# Patient Record
Sex: FEMALE | Race: WHITE | Employment: STUDENT | URBAN - METROPOLITAN AREA
[De-identification: names, ages, dates, MRNs, and addresses within clinical notes are randomized per-mention and may not be internally consistent; named-entity substitution may affect disease eponyms.]

---
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
## 2022-07-29 ENCOUNTER — PATIENT MESSAGE (OUTPATIENT)
Dept: OBGYN CLINIC | Age: 22
End: 2022-07-29

## 2022-08-05 ENCOUNTER — OFFICE VISIT (OUTPATIENT)
Dept: OBGYN CLINIC | Age: 22
End: 2022-08-05
Payer: COMMERCIAL

## 2022-08-05 VITALS
BODY MASS INDEX: 23.22 KG/M2 | HEART RATE: 74 BPM | HEIGHT: 61 IN | DIASTOLIC BLOOD PRESSURE: 69 MMHG | WEIGHT: 123 LBS | SYSTOLIC BLOOD PRESSURE: 111 MMHG

## 2022-08-05 DIAGNOSIS — N92.4 EXCESSIVE BLEEDING IN PREMENOPAUSAL PERIOD: ICD-10-CM

## 2022-08-05 DIAGNOSIS — Z76.89 ENCOUNTER FOR MENSTRUAL REGULATION: ICD-10-CM

## 2022-08-05 DIAGNOSIS — N94.6 DYSMENORRHEA: Primary | ICD-10-CM

## 2022-08-05 PROCEDURE — 99212 OFFICE O/P EST SF 10 MIN: CPT | Performed by: OBSTETRICS & GYNECOLOGY

## 2022-08-05 NOTE — PROGRESS NOTES
[address] [facility]  [email or web address]  [phone]    Elissa Huffman MD, FACOG       OB/GYN Problem visit    Chief Complaint:   Chief Complaint   Patient presents with    Birth Control       Last or next WWE is: 8/17/22    History of Present Illness: This is a new problem being evaluated by this provider. Pt was on Blisovi OCP in the past, then switched to [facility], she notices on [facility] she has cramps & HA. Pt wants to switch to something else for birth control. AE due after 8/17/22, but pt is going to college & plays sports. Pt declines STD testing. More nausea and breast tenderness on OCP. The patient is a 25 y.o. No obstetric history on file. She reports the symptoms are has worsened. Aggravating factors include none. Alleviating factors include none. She does not have other concerns. LMP: No LMP recorded. PFSH:  Past Medical History:   Diagnosis Date    HPV vaccine counseling     completed    Migraine headache     without aura     History reviewed. No pertinent surgical history. Family History   Problem Relation Age of Onset    No Known Problems Mother      Social History     Tobacco Use    Smoking status: Never    Smokeless tobacco: Former   Substance Use Topics    Alcohol use: No    Drug use: Never     Allergies   Allergen Reactions    Pcn [Penicillins] Hives     Current Outpatient Medications   Medication Sig    drospirenone-ethinyl estradioL ([place], 28,) 3-0.02 mg tab Take 1 Tablet by mouth daily. No current facility-administered medications for this visit.        Review of Systems:  History obtained from the patient  Constitutional: see HPI  ENT ROS: negative for - hearing change, oral lesions or visual changes  Respiratory: negative for cough, wheezing or dyspnea on exertion  Cardiovascular: negative for chest pain, irregular heart beats, exertional chest pressure/discomfort  Gastrointestinal: negative for dysphagia, nausea and vomiting  Genito-Urinary ROS:  see HPI  Inteument/breast: negative for rash, breast lump and nipple discharge  Musculoskeletal:negative for stiff joints, neck pain and muscle weakness  Endocrine ROS: negative for - breast changes, galactorrhea or temperature intolerance  Hematological and Lymphatic ROS: negative for - blood clots, bruising or swollen lymph nodes    Physical Exam:  Visit Vitals  /69   Pulse 74   Ht 5' 1\" (1.549 m)   Wt 123 lb (55.8 kg)   BMI 23.24 kg/m²       GENERAL: alert, well appearing, and in no distress  HEAD: normocephalic, atraumatic. NEURO: alert, oriented, normal speech    Assessment:  Encounter Diagnoses   Name Primary? Dysmenorrhea Yes    Excessive bleeding in premenopausal period        Plan:  The patient is advised that she should contact the office if she does not note improvement or if symptoms recur  Recommend follow up with PCP for non-gynecologic complaints and chronic medical problems. She should contact our office with any questions or concerns  She could keep her routine annual exam appointment. Discussed risks, benefits and alternatives of OCP/nuvaring/patch: including but not limited to dvt/pe/mi/cva/ca/gi risks and that smoking, increasing age and other health conditions can increase these risks. We discussed progesterone only and non hormonal options for contraception including but not limited to condoms, IUDs, Nexplanon, and depo provera. Nexplanon ho   LARC ho  Will try to work in before she goes to school and then do wwe when home on break      No orders of the defined types were placed in this encounter. No results found for this visit on 08/05/22.

## 2022-08-19 ENCOUNTER — OFFICE VISIT (OUTPATIENT)
Dept: OBGYN CLINIC | Age: 22
End: 2022-08-19
Payer: COMMERCIAL

## 2022-08-19 DIAGNOSIS — Z76.89 ENCOUNTER FOR MENSTRUAL REGULATION: ICD-10-CM

## 2022-08-19 DIAGNOSIS — Z01.812 PRE-PROCEDURE LAB EXAM: Primary | ICD-10-CM

## 2022-08-19 DIAGNOSIS — Z30.017 NEXPLANON INSERTION: ICD-10-CM

## 2022-08-19 LAB
HCG URINE, QL. (POC): NEGATIVE
VALID INTERNAL CONTROL?: YES

## 2022-08-19 PROCEDURE — 81025 URINE PREGNANCY TEST: CPT | Performed by: OBSTETRICS & GYNECOLOGY

## 2022-08-19 NOTE — PROGRESS NOTES
Pt presents today for Nexplanon insertion. Our office was unable to obtain a Nexplanon to insert in patient. Clinical Supervisor \"Radha\" [facility] discussed with pt how we will need to r/s appt. Pt was r/s to 9:40am on Monday. Pt will drive 2 hours away from school to have Nexplanon inserted. Pt instructed not to have unprotected intercourse this weekend.

## 2022-08-22 ENCOUNTER — OFFICE VISIT (OUTPATIENT)
Dept: OBGYN CLINIC | Age: 22
End: 2022-08-22
Payer: COMMERCIAL

## 2022-08-22 VITALS
HEART RATE: 78 BPM | DIASTOLIC BLOOD PRESSURE: 79 MMHG | BODY MASS INDEX: 23.32 KG/M2 | WEIGHT: 123.4 LBS | SYSTOLIC BLOOD PRESSURE: 117 MMHG

## 2022-08-22 DIAGNOSIS — Z76.89 ENCOUNTER FOR MENSTRUAL REGULATION: ICD-10-CM

## 2022-08-22 DIAGNOSIS — Z30.017 NEXPLANON INSERTION: ICD-10-CM

## 2022-08-22 DIAGNOSIS — Z01.812 PRE-PROCEDURE LAB EXAM: Primary | ICD-10-CM

## 2022-08-22 LAB
HCG URINE, QL. (POC): NEGATIVE
VALID INTERNAL CONTROL?: YES

## 2022-08-22 PROCEDURE — 81025 URINE PREGNANCY TEST: CPT | Performed by: OBSTETRICS & GYNECOLOGY

## 2022-08-22 PROCEDURE — 11981 INSERTION DRUG DLVR IMPLANT: CPT | Performed by: OBSTETRICS & GYNECOLOGY

## 2022-08-22 NOTE — PROGRESS NOTES
[address] [facility]  [email or web address]  [phone]    Eduar Galaviz MD, FACOG       [facility]  OFFICE PROCEDURE PROGRESS NOTE    Chart reviewed for the following:   I, Sonya Osorio LPN, have reviewed the History, Physical and updated the [place] reactions for [address] performed immediately prior to start of procedure:   Aleksey Kahn LPN, have performed the following reviews on [address] prior to the start of the procedure:            * Patient was identified by name and date of birth   * Agreement on procedure being performed was verified  * Risks and Benefits explained to the patient  * Procedure site verified and marked as necessary  * Patient was positioned for comfort  * Consent was signed and verified     Time: 0945    Date of procedure: 8/22/2022    Procedure performed by: Eduar Galaviz MD    Provider assisted by:   Sonya Osorio LPN    Patient assisted by: self    How tolerated by patient: tolerated the procedure well with no complications    Post Procedural Pain Scale: 0 - No Hurt    Comments: none      Procedure note: Nexplanon insertion    [address] is a No obstetric history on file. ,  25 y.o. female WHITE/NON- whose No LMP recorded. was on  presents for office insertion of an [address] sub-dermal contraceptive implant. She has had an opportunity to read the [address] \"Patient Labeling and Consent Form\". We counseled the patient about the insertion and removal procedures as well as potential side-effects, benefits and risks. She state she had no further questions and signed the consent form. She has been using OCP (Oral Contraceptive Pills) to the present time. She confirmed that she has no allergies to Betadine or Xylocaine. She reclined on the examination table in the supine position with her left arm flexed at the elbow and externally rotated.  The insertion site was identified and marked approximately 6-8 cm proximal to the elbow crease at the inner side of the upper arm in the standard fashion. A second madhuri was placed 6-8 cm above the first madhuri. The insertion site was cleansed with Betadine antiseptic. Approximately 4 ml of 1% plain xylocaine were injected just under the skin along the planned insertion canal. The NEXPLANON sterile applicator was carefully removed from its blister pack and kept sterile. I removed the needle cap. I visually verified the presence of NEXPLANON inside the needle tip. The skin at the insertion site was then stretched by my thumb and index finger. I then inserted the needle tip through the skin at the appropriate angle to the skin surface, just until the skin has been penetrated. The needle was gently inserted to its full length. The slider was then pushed all the way and then released. I then removed the needle and palpated the implant in the appropriate location. The patient also palpated the implant in place. Both the patient and I were able to confirm the presence of the Anais Mussel in its subdermal location by palpation. A small adhesive bandage over the insertion site then wrapped a pressure bandage with sterile gauze. The patient User Card and Patient Chart Label were filled in. She was given the User Card for her records after explaining it to her in detail. I stressed to her that she must have the Anais Mussel removed before three years from today's date. She was then given the Personal Calendar (Bleeding Diary) with instructions in it's use. The patient received Nexplanon lot number [id number]. The Nexplanon did not come from a speciality pharmacy. We discussed typical bleeding patterns and side effects with the Nexplanon  We recommend bleeding/symptom calendar and follow three months to evaluate. We recommended back up contraception x 2 weeks after insertion.

## 2022-08-29 RX ORDER — DROSPIRENONE AND ETHINYL ESTRADIOL 0.02-3(28)
KIT ORAL
Qty: 84 TABLET | Refills: 4 | OUTPATIENT
Start: 2022-08-29

## 2023-06-22 ENCOUNTER — PROCEDURE VISIT (OUTPATIENT)
Age: 23
End: 2023-06-22

## 2023-06-22 VITALS
HEART RATE: 88 BPM | WEIGHT: 140.4 LBS | SYSTOLIC BLOOD PRESSURE: 111 MMHG | BODY MASS INDEX: 26.51 KG/M2 | HEIGHT: 61 IN | DIASTOLIC BLOOD PRESSURE: 71 MMHG

## 2023-06-22 DIAGNOSIS — Z30.46 NEXPLANON REMOVAL: ICD-10-CM

## 2023-06-22 DIAGNOSIS — Z76.89 ENCOUNTER FOR MENSTRUAL REGULATION: Primary | ICD-10-CM

## 2023-06-22 NOTE — PROGRESS NOTES
Jerson Rossi is a 23 y.o. female presents for a problem visit. No chief complaint on file. Problems: nexplanon removal to have no hormones. Pt reports she has been on birth control the last 5 years. No LMP recorded. Birth Control: nexplanon. Last Pap: never obtained d/t age, pt reports she had a check up in the past year but does not have records. [address] REMOVAL        Chart reviewed for the following:   Jhoana VILLANUEVA, [place], have reviewed the History, Physical and updated the Allergic reactions for [address] [address] performed immediately prior to start of procedure:   Jhoana VILLANUEVA [place], [place], have performed the following reviews on Jerson Rossi prior to the start of the procedure:            * Patient was identified by name and date of birth   * Agreement on procedure being performed was verified  * Risks and Benefits explained to the patient  * Procedure site verified and marked as necessary  * Patient was positioned for comfort  * Consent was signed and verified     Time: 10:30am    Date of procedure: 6/22/2023    Procedure performed by:   Demarcus Goldstein MD       Provider assisted by: Diego Pascual    Patient assisted by: self    How tolerated by patient: tolerated the procedure well with no complications    Post Procedural Pain Scale: 0 - No Hurt    Comments: none    Examination chaperoned by Jhoana Nino MA.

## 2023-06-22 NOTE — PROGRESS NOTES
_ [address] [facility]  [email or web address]  [phone]    Belem Kaplan MD, FACOG       Procedure: Nexplanon Removal        Chart reviewed for the following:   I, Kaushik Bansal MD, have reviewed the History, Physical and updated the Allergic reactions for [address] performed immediately prior to start of procedure:   Sumi Shook MD, have performed the following reviews on [address] prior to the start of the procedure:            * Patient was identified by name and date of birth   * Agreement on procedure being performed was verified  * Risks and Benefits explained to the patient  * Procedure site verified and marked as necessary  * Patient was positioned for comfort  * Consent was signed and verified     Time: 11:29 AM      Date of procedure: 6/22/2023    Procedure performed by: Kaushik Bansal MD    Provider assisted by:   Connie Herron MA    Patient assisted by:  self    How tolerated by patient: tolerated the procedure well with no complications    Post Procedural Pain Scale:  low     Comments: none    Procedure: The patient presents for office removal of a NEXPLANON sub-dermal contraceptive implant. Patient elects removal because AUB, wants to go hormonal free. She was positioned so the site of her implant was visable and easily accessible. The implant was located by palpation. The end of the implant nearest the elbow was marked with a sterile marker. The operative site was cleansed with Betadine. Using a 27 gauge needle on a 5 cc syringe and 1% lidocaine, 3 cc were infiltrated as a intradermal wheal and underneath the end of the implant closest to the elbow. Downward pressure was applied on the end of the implant nearest the axilla and a 2-3mm incision was made in the longitudinal direction of the arm at the tip of the implant closest to the elbow. The implant was then pushed gently toward the incision until the tip was visible.  The fibrous